# Patient Record
Sex: FEMALE | Race: ASIAN | NOT HISPANIC OR LATINO
[De-identification: names, ages, dates, MRNs, and addresses within clinical notes are randomized per-mention and may not be internally consistent; named-entity substitution may affect disease eponyms.]

---

## 2023-08-24 ENCOUNTER — NON-APPOINTMENT (OUTPATIENT)
Age: 33
End: 2023-08-24

## 2023-08-25 ENCOUNTER — ASOB RESULT (OUTPATIENT)
Age: 33
End: 2023-08-25

## 2023-08-25 ENCOUNTER — APPOINTMENT (OUTPATIENT)
Dept: ANTEPARTUM | Facility: CLINIC | Age: 33
End: 2023-08-25
Payer: COMMERCIAL

## 2023-08-25 PROCEDURE — 76801 OB US < 14 WKS SINGLE FETUS: CPT | Mod: 59

## 2023-08-25 PROCEDURE — 36415 COLL VENOUS BLD VENIPUNCTURE: CPT

## 2023-08-25 PROCEDURE — 93976 VASCULAR STUDY: CPT

## 2023-08-25 PROCEDURE — 76813 OB US NUCHAL MEAS 1 GEST: CPT

## 2023-09-25 ENCOUNTER — ASOB RESULT (OUTPATIENT)
Age: 33
End: 2023-09-25

## 2023-09-25 ENCOUNTER — APPOINTMENT (OUTPATIENT)
Dept: ANTEPARTUM | Facility: CLINIC | Age: 33
End: 2023-09-25
Payer: COMMERCIAL

## 2023-09-25 PROCEDURE — 76805 OB US >/= 14 WKS SNGL FETUS: CPT

## 2023-09-25 PROCEDURE — 76817 TRANSVAGINAL US OBSTETRIC: CPT

## 2023-10-26 ENCOUNTER — ASOB RESULT (OUTPATIENT)
Age: 33
End: 2023-10-26

## 2023-10-26 ENCOUNTER — APPOINTMENT (OUTPATIENT)
Dept: ANTEPARTUM | Facility: CLINIC | Age: 33
End: 2023-10-26
Payer: COMMERCIAL

## 2023-10-26 PROCEDURE — 76817 TRANSVAGINAL US OBSTETRIC: CPT | Mod: 59

## 2023-10-26 PROCEDURE — 76811 OB US DETAILED SNGL FETUS: CPT

## 2023-11-01 ENCOUNTER — ASOB RESULT (OUTPATIENT)
Age: 33
End: 2023-11-01

## 2023-11-01 ENCOUNTER — APPOINTMENT (OUTPATIENT)
Dept: ANTEPARTUM | Facility: CLINIC | Age: 33
End: 2023-11-01
Payer: COMMERCIAL

## 2023-11-01 PROCEDURE — 76946 ECHO GUIDE FOR AMNIOCENTESIS: CPT

## 2023-11-01 PROCEDURE — 59000 AMNIOCENTESIS DIAGNOSTIC: CPT

## 2023-11-27 ENCOUNTER — APPOINTMENT (OUTPATIENT)
Dept: ANTEPARTUM | Facility: CLINIC | Age: 33
End: 2023-11-27
Payer: COMMERCIAL

## 2023-11-27 ENCOUNTER — ASOB RESULT (OUTPATIENT)
Age: 33
End: 2023-11-27

## 2023-11-27 PROCEDURE — 76820 UMBILICAL ARTERY ECHO: CPT | Mod: 59

## 2023-11-27 PROCEDURE — 76816 OB US FOLLOW-UP PER FETUS: CPT

## 2023-11-27 PROCEDURE — 76819 FETAL BIOPHYS PROFIL W/O NST: CPT

## 2024-01-09 ENCOUNTER — APPOINTMENT (OUTPATIENT)
Dept: ANTEPARTUM | Facility: CLINIC | Age: 34
End: 2024-01-09
Payer: COMMERCIAL

## 2024-01-09 ENCOUNTER — ASOB RESULT (OUTPATIENT)
Age: 34
End: 2024-01-09

## 2024-01-09 PROCEDURE — 76819 FETAL BIOPHYS PROFIL W/O NST: CPT

## 2024-01-09 PROCEDURE — 76816 OB US FOLLOW-UP PER FETUS: CPT | Mod: 59

## 2024-01-29 ENCOUNTER — APPOINTMENT (OUTPATIENT)
Dept: ANTEPARTUM | Facility: CLINIC | Age: 34
End: 2024-01-29
Payer: COMMERCIAL

## 2024-01-29 ENCOUNTER — ASOB RESULT (OUTPATIENT)
Age: 34
End: 2024-01-29

## 2024-01-29 PROCEDURE — 76820 UMBILICAL ARTERY ECHO: CPT | Mod: 59

## 2024-01-29 PROCEDURE — 76816 OB US FOLLOW-UP PER FETUS: CPT | Mod: 59

## 2024-01-29 PROCEDURE — 76819 FETAL BIOPHYS PROFIL W/O NST: CPT

## 2024-02-12 ENCOUNTER — ASOB RESULT (OUTPATIENT)
Age: 34
End: 2024-02-12

## 2024-02-12 ENCOUNTER — APPOINTMENT (OUTPATIENT)
Dept: ANTEPARTUM | Facility: CLINIC | Age: 34
End: 2024-02-12
Payer: COMMERCIAL

## 2024-02-12 PROCEDURE — 76816 OB US FOLLOW-UP PER FETUS: CPT

## 2024-02-12 PROCEDURE — 76820 UMBILICAL ARTERY ECHO: CPT | Mod: 59

## 2024-02-12 PROCEDURE — 76819 FETAL BIOPHYS PROFIL W/O NST: CPT | Mod: 59

## 2024-02-26 ENCOUNTER — APPOINTMENT (OUTPATIENT)
Dept: ANTEPARTUM | Facility: CLINIC | Age: 34
End: 2024-02-26
Payer: COMMERCIAL

## 2024-02-26 ENCOUNTER — ASOB RESULT (OUTPATIENT)
Age: 34
End: 2024-02-26

## 2024-02-26 PROCEDURE — 76820 UMBILICAL ARTERY ECHO: CPT | Mod: 59

## 2024-02-26 PROCEDURE — 76816 OB US FOLLOW-UP PER FETUS: CPT | Mod: 59

## 2024-02-26 PROCEDURE — 76819 FETAL BIOPHYS PROFIL W/O NST: CPT

## 2024-02-27 ENCOUNTER — INPATIENT (INPATIENT)
Facility: HOSPITAL | Age: 34
LOS: 1 days | Discharge: HOME | End: 2024-02-29
Attending: OBSTETRICS & GYNECOLOGY | Admitting: OBSTETRICS & GYNECOLOGY
Payer: COMMERCIAL

## 2024-02-27 VITALS
RESPIRATION RATE: 20 BRPM | DIASTOLIC BLOOD PRESSURE: 68 MMHG | HEART RATE: 83 BPM | OXYGEN SATURATION: 100 % | SYSTOLIC BLOOD PRESSURE: 125 MMHG | TEMPERATURE: 98 F

## 2024-02-27 DIAGNOSIS — O26.899 OTHER SPECIFIED PREGNANCY RELATED CONDITIONS, UNSPECIFIED TRIMESTER: ICD-10-CM

## 2024-02-27 LAB
ALBUMIN SERPL ELPH-MCNC: 3.4 G/DL — SIGNIFICANT CHANGE UP (ref 3.3–5)
ALP SERPL-CCNC: 204 U/L — HIGH (ref 40–120)
ALT FLD-CCNC: 10 U/L — SIGNIFICANT CHANGE UP (ref 10–45)
ANION GAP SERPL CALC-SCNC: 9 MMOL/L — SIGNIFICANT CHANGE UP (ref 5–17)
APTT BLD: 28.6 SEC — SIGNIFICANT CHANGE UP (ref 24.5–35.6)
AST SERPL-CCNC: 18 U/L — SIGNIFICANT CHANGE UP (ref 10–40)
BASOPHILS # BLD AUTO: 0.04 K/UL — SIGNIFICANT CHANGE UP (ref 0–0.2)
BASOPHILS NFR BLD AUTO: 0.4 % — SIGNIFICANT CHANGE UP (ref 0–2)
BILIRUB SERPL-MCNC: 0.2 MG/DL — SIGNIFICANT CHANGE UP (ref 0.2–1.2)
BLD GP AB SCN SERPL QL: NEGATIVE — SIGNIFICANT CHANGE UP
BUN SERPL-MCNC: 8 MG/DL — SIGNIFICANT CHANGE UP (ref 7–23)
CALCIUM SERPL-MCNC: 9.7 MG/DL — SIGNIFICANT CHANGE UP (ref 8.4–10.5)
CHLORIDE SERPL-SCNC: 105 MMOL/L — SIGNIFICANT CHANGE UP (ref 96–108)
CO2 SERPL-SCNC: 21 MMOL/L — LOW (ref 22–31)
CREAT ?TM UR-MCNC: 50 MG/DL — SIGNIFICANT CHANGE UP
CREAT SERPL-MCNC: 0.8 MG/DL — SIGNIFICANT CHANGE UP (ref 0.5–1.3)
EGFR: 99 ML/MIN/1.73M2 — SIGNIFICANT CHANGE UP
EOSINOPHIL # BLD AUTO: 0.04 K/UL — SIGNIFICANT CHANGE UP (ref 0–0.5)
EOSINOPHIL NFR BLD AUTO: 0.4 % — SIGNIFICANT CHANGE UP (ref 0–6)
FIBRINOGEN PPP-MCNC: 516 MG/DL — HIGH (ref 200–445)
GLUCOSE SERPL-MCNC: 104 MG/DL — HIGH (ref 70–99)
HCT VFR BLD CALC: 33.1 % — LOW (ref 34.5–45)
HGB BLD-MCNC: 10.8 G/DL — LOW (ref 11.5–15.5)
IMM GRANULOCYTES NFR BLD AUTO: 1.8 % — HIGH (ref 0–0.9)
INR BLD: 0.91 — SIGNIFICANT CHANGE UP (ref 0.85–1.18)
LDH SERPL L TO P-CCNC: 222 U/L — SIGNIFICANT CHANGE UP (ref 50–242)
LYMPHOCYTES # BLD AUTO: 1.47 K/UL — SIGNIFICANT CHANGE UP (ref 1–3.3)
LYMPHOCYTES # BLD AUTO: 14.1 % — SIGNIFICANT CHANGE UP (ref 13–44)
MCHC RBC-ENTMCNC: 29.6 PG — SIGNIFICANT CHANGE UP (ref 27–34)
MCHC RBC-ENTMCNC: 32.6 GM/DL — SIGNIFICANT CHANGE UP (ref 32–36)
MCV RBC AUTO: 90.7 FL — SIGNIFICANT CHANGE UP (ref 80–100)
MONOCYTES # BLD AUTO: 0.7 K/UL — SIGNIFICANT CHANGE UP (ref 0–0.9)
MONOCYTES NFR BLD AUTO: 6.7 % — SIGNIFICANT CHANGE UP (ref 2–14)
NEUTROPHILS # BLD AUTO: 8.01 K/UL — HIGH (ref 1.8–7.4)
NEUTROPHILS NFR BLD AUTO: 76.6 % — SIGNIFICANT CHANGE UP (ref 43–77)
NRBC # BLD: 0 /100 WBCS — SIGNIFICANT CHANGE UP (ref 0–0)
PLATELET # BLD AUTO: 208 K/UL — SIGNIFICANT CHANGE UP (ref 150–400)
POTASSIUM SERPL-MCNC: 4.4 MMOL/L — SIGNIFICANT CHANGE UP (ref 3.5–5.3)
POTASSIUM SERPL-SCNC: 4.4 MMOL/L — SIGNIFICANT CHANGE UP (ref 3.5–5.3)
PROT ?TM UR-MCNC: 6 MG/DL — SIGNIFICANT CHANGE UP (ref 0–12)
PROT SERPL-MCNC: 6.8 G/DL — SIGNIFICANT CHANGE UP (ref 6–8.3)
PROT/CREAT UR-RTO: 0.1 RATIO — SIGNIFICANT CHANGE UP (ref 0–0.2)
PROTHROM AB SERPL-ACNC: 10.4 SEC — SIGNIFICANT CHANGE UP (ref 9.5–13)
RBC # BLD: 3.65 M/UL — LOW (ref 3.8–5.2)
RBC # FLD: 13.8 % — SIGNIFICANT CHANGE UP (ref 10.3–14.5)
RH IG SCN BLD-IMP: POSITIVE — SIGNIFICANT CHANGE UP
RH IG SCN BLD-IMP: POSITIVE — SIGNIFICANT CHANGE UP
SODIUM SERPL-SCNC: 135 MMOL/L — SIGNIFICANT CHANGE UP (ref 135–145)
URATE SERPL-MCNC: 5.2 MG/DL — SIGNIFICANT CHANGE UP (ref 2.5–7)
WBC # BLD: 10.45 K/UL — SIGNIFICANT CHANGE UP (ref 3.8–10.5)
WBC # FLD AUTO: 10.45 K/UL — SIGNIFICANT CHANGE UP (ref 3.8–10.5)

## 2024-02-27 RX ORDER — MAGNESIUM HYDROXIDE 400 MG/1
30 TABLET, CHEWABLE ORAL
Refills: 0 | Status: DISCONTINUED | OUTPATIENT
Start: 2024-02-27 | End: 2024-02-29

## 2024-02-27 RX ORDER — BENZOCAINE 10 %
1 GEL (GRAM) MUCOUS MEMBRANE EVERY 6 HOURS
Refills: 0 | Status: DISCONTINUED | OUTPATIENT
Start: 2024-02-27 | End: 2024-02-29

## 2024-02-27 RX ORDER — OXYTOCIN 10 UNIT/ML
VIAL (ML) INJECTION
Qty: 30 | Refills: 0 | Status: DISCONTINUED | OUTPATIENT
Start: 2024-02-27 | End: 2024-02-27

## 2024-02-27 RX ORDER — CITRIC ACID/SODIUM CITRATE 300-500 MG
15 SOLUTION, ORAL ORAL EVERY 6 HOURS
Refills: 0 | Status: DISCONTINUED | OUTPATIENT
Start: 2024-02-27 | End: 2024-02-27

## 2024-02-27 RX ORDER — FENTANYL/BUPIVACAINE/NS/PF 2MCG/ML-.1
250 PLASTIC BAG, INJECTION (ML) INJECTION
Refills: 0 | Status: DISCONTINUED | OUTPATIENT
Start: 2024-02-27 | End: 2024-02-27

## 2024-02-27 RX ORDER — PRAMOXINE HYDROCHLORIDE 150 MG/15G
1 AEROSOL, FOAM RECTAL EVERY 4 HOURS
Refills: 0 | Status: DISCONTINUED | OUTPATIENT
Start: 2024-02-27 | End: 2024-02-29

## 2024-02-27 RX ORDER — SIMETHICONE 80 MG/1
80 TABLET, CHEWABLE ORAL EVERY 4 HOURS
Refills: 0 | Status: DISCONTINUED | OUTPATIENT
Start: 2024-02-27 | End: 2024-02-29

## 2024-02-27 RX ORDER — TETANUS TOXOID, REDUCED DIPHTHERIA TOXOID AND ACELLULAR PERTUSSIS VACCINE, ADSORBED 5; 2.5; 8; 8; 2.5 [IU]/.5ML; [IU]/.5ML; UG/.5ML; UG/.5ML; UG/.5ML
0.5 SUSPENSION INTRAMUSCULAR ONCE
Refills: 0 | Status: DISCONTINUED | OUTPATIENT
Start: 2024-02-27 | End: 2024-02-29

## 2024-02-27 RX ORDER — CHLORHEXIDINE GLUCONATE 213 G/1000ML
1 SOLUTION TOPICAL DAILY
Refills: 0 | Status: DISCONTINUED | OUTPATIENT
Start: 2024-02-27 | End: 2024-02-27

## 2024-02-27 RX ORDER — LANOLIN
1 OINTMENT (GRAM) TOPICAL EVERY 6 HOURS
Refills: 0 | Status: DISCONTINUED | OUTPATIENT
Start: 2024-02-27 | End: 2024-02-29

## 2024-02-27 RX ORDER — ACETAMINOPHEN 500 MG
975 TABLET ORAL
Refills: 0 | Status: DISCONTINUED | OUTPATIENT
Start: 2024-02-27 | End: 2024-02-29

## 2024-02-27 RX ORDER — SODIUM CHLORIDE 9 MG/ML
3 INJECTION INTRAMUSCULAR; INTRAVENOUS; SUBCUTANEOUS EVERY 8 HOURS
Refills: 0 | Status: DISCONTINUED | OUTPATIENT
Start: 2024-02-27 | End: 2024-02-29

## 2024-02-27 RX ORDER — OXYCODONE HYDROCHLORIDE 5 MG/1
5 TABLET ORAL
Refills: 0 | Status: DISCONTINUED | OUTPATIENT
Start: 2024-02-27 | End: 2024-02-29

## 2024-02-27 RX ORDER — SODIUM CHLORIDE 9 MG/ML
1000 INJECTION, SOLUTION INTRAVENOUS
Refills: 0 | Status: DISCONTINUED | OUTPATIENT
Start: 2024-02-27 | End: 2024-02-27

## 2024-02-27 RX ORDER — AER TRAVELER 0.5 G/1
1 SOLUTION RECTAL; TOPICAL EVERY 4 HOURS
Refills: 0 | Status: DISCONTINUED | OUTPATIENT
Start: 2024-02-27 | End: 2024-02-29

## 2024-02-27 RX ORDER — OXYTOCIN 10 UNIT/ML
41.67 VIAL (ML) INJECTION
Qty: 20 | Refills: 0 | Status: DISCONTINUED | OUTPATIENT
Start: 2024-02-27 | End: 2024-02-29

## 2024-02-27 RX ORDER — OXYCODONE HYDROCHLORIDE 5 MG/1
5 TABLET ORAL ONCE
Refills: 0 | Status: DISCONTINUED | OUTPATIENT
Start: 2024-02-27 | End: 2024-02-29

## 2024-02-27 RX ORDER — OXYTOCIN 10 UNIT/ML
333.33 VIAL (ML) INJECTION
Qty: 20 | Refills: 0 | Status: DISCONTINUED | OUTPATIENT
Start: 2024-02-27 | End: 2024-02-27

## 2024-02-27 RX ORDER — DIBUCAINE 1 %
1 OINTMENT (GRAM) RECTAL EVERY 6 HOURS
Refills: 0 | Status: DISCONTINUED | OUTPATIENT
Start: 2024-02-27 | End: 2024-02-29

## 2024-02-27 RX ORDER — DIPHENHYDRAMINE HCL 50 MG
25 CAPSULE ORAL EVERY 6 HOURS
Refills: 0 | Status: DISCONTINUED | OUTPATIENT
Start: 2024-02-27 | End: 2024-02-29

## 2024-02-27 RX ORDER — IBUPROFEN 200 MG
600 TABLET ORAL EVERY 6 HOURS
Refills: 0 | Status: COMPLETED | OUTPATIENT
Start: 2024-02-27 | End: 2025-01-25

## 2024-02-27 RX ORDER — IBUPROFEN 200 MG
600 TABLET ORAL EVERY 6 HOURS
Refills: 0 | Status: DISCONTINUED | OUTPATIENT
Start: 2024-02-27 | End: 2024-02-29

## 2024-02-27 RX ORDER — KETOROLAC TROMETHAMINE 30 MG/ML
30 SYRINGE (ML) INJECTION ONCE
Refills: 0 | Status: DISCONTINUED | OUTPATIENT
Start: 2024-02-27 | End: 2024-02-27

## 2024-02-27 RX ORDER — HYDROCORTISONE 1 %
1 OINTMENT (GRAM) TOPICAL EVERY 6 HOURS
Refills: 0 | Status: DISCONTINUED | OUTPATIENT
Start: 2024-02-27 | End: 2024-02-29

## 2024-02-27 RX ADMIN — Medication 250 MILLILITER(S): at 16:48

## 2024-02-27 RX ADMIN — SODIUM CHLORIDE 125 MILLILITER(S): 9 INJECTION, SOLUTION INTRAVENOUS at 11:31

## 2024-02-27 RX ADMIN — Medication 30 MILLIGRAM(S): at 23:17

## 2024-02-27 RX ADMIN — SODIUM CHLORIDE 125 MILLILITER(S): 9 INJECTION, SOLUTION INTRAVENOUS at 16:29

## 2024-02-27 RX ADMIN — Medication 2 MILLIUNIT(S)/MIN: at 11:45

## 2024-02-27 RX ADMIN — SODIUM CHLORIDE 125 MILLILITER(S): 9 INJECTION, SOLUTION INTRAVENOUS at 15:34

## 2024-02-27 NOTE — OB PROVIDER DELIVERY SUMMARY - NSSELHIDDEN_OBGYN_ALL_OB_FT
[NS_DeliveryAttending1_OBGYN_ALL_OB_FT:[id number]==],[NS_DeliveryAssist1_OBGYN_ALL_OB_FT:[id number]],[NS_DeliveryRN_OBGYN_ALL_OB_FT:[id number]=],[NS_CirculateRN2_OBGYN_ALL_OB_FT:[id number]]

## 2024-02-27 NOTE — OB PROVIDER LABOR PROGRESS NOTE - NS_OBIHIFHRDETAILS_OBGYN_ALL_OB_FT
130 baseline, moderate variability, +accels, no decels
baseline 135;  moderate variability; -accels; -decels; category I tracing
baseline 135; moderate variability; +accels; -decels; category I tracing
baseline 140; moderate variability; +accels; -decels; category I tracing
baseline 145; moderate variability; -accels; non recurrent variable decels; category II tracing overall reassuring with moderate variability
categ 1 tracing
baseline 130; moderate variability; +accels; -decels; reactive and reassuring tracing

## 2024-02-27 NOTE — OB RN DELIVERY SUMMARY - NSSELHIDDEN_OBGYN_ALL_OB_FT
[NS_DeliveryAttending1_OBGYN_ALL_OB_FT:[id number]],[NS_DeliveryAssist1_OBGYN_ALL_OB_FT:[id number]=],[NS_DeliveryRN_OBGYN_ALL_OB_FT:[id number]=],[NS_CirculateRN2_OBGYN_ALL_OB_FT:[id number]=]

## 2024-02-27 NOTE — OB PROVIDER H&P - HISTORY OF PRESENT ILLNESS
32 yo  at 39 weeks 2 days presenting with premature rupture of membranes which occurred at 7:30am on .   Positive nitrazine, ferning, and pooling on speculum exam. Cervix softening at 1cm dilated, patient not having regular contractions (felt two contractions over past 3 hours). Two fetal movements over past 3 hours.  US showed cephalic long   32 yo  at 39 weeks 2 days presenting with leakage of fluid which occurred at 7:30am on . She reports active leakage of fluid since this initial event.  Patient not having regular contractions (felt two contractions over past 3 hours). Two fetal movements over past 3 hours. Denies vaginal bleeding. Endorses fetal movements.    Ante: spontaneous pregnancy; NIPT wnl; Amniocentesis wnl for fetal VSD; Anatomy scan otherwise wnl; Fetal echo wnl, VSD resolved; GCT passed; Denies elevated BPs; GBS negative; EFW 3500g    OBHx: G1 current  GYNHx: Right lateral 5cm fibroid; denies other  PMH: denies  PSH: denies  Meds: PNVs  ALL: NKDA      PE:  Gen: no apparent distress  Pulm: no increased work of breathing  Abd: nontender; gravid  Ext: no calf tenderness bilaterally    SVE: 1/long  SSE: pooling clear fluid; nitrazine positive; ferning positive    TAUS: cephalic fetus    FHT: baseline 140; moderate variability; +accels; -decels; reactive and reassuring tracing  TOCO: irregular contractions 2-3 in 10min      A/P:  32 yo  at 39w2d presents with LOF, found to have PROM. Patient not experiencing regular or painful contractions in the presence of minimal cervical change. Maternal and fetal statuses reassuring at this time  - Admit to LD  - Diet NPO  - Continuous fetal monitoring  - Labs ordered; full labs  - Plan for augmentation with pitocin  - Consents signed  - Discussed with Dr. Lopez, Dr. Mejia

## 2024-02-27 NOTE — OB PROVIDER H&P - NS_OBGYNHISTORY_OBGYN_ALL_OB_FT
resolved fetal VSD  Had amnio which was normal and normal fetal anatomy scan  GBS negative  Estimated fetal weight 3260g  Previously taking aspirin per OBGYN, reports poor compliance and discontinued at 36 weeks  5cm subserosal fibroid on the right

## 2024-02-27 NOTE — OB RN TRIAGE NOTE - FALL HARM RISK - UNIVERSAL INTERVENTIONS
Bed in lowest position, wheels locked, appropriate side rails in place/Call bell, personal items and telephone in reach/Instruct patient to call for assistance before getting out of bed or chair/Non-slip footwear when patient is out of bed/[place] to call system/Physically safe environment - no spills, clutter or unnecessary equipment/Purposeful Proactive Rounding/Room/bathroom lighting operational, light cord in reach

## 2024-02-27 NOTE — OB PROVIDER LABOR PROGRESS NOTE - NS_SUBJECTIVE/OBJECTIVE_OBGYN_ALL_OB_FT
EFM reviewed
EFM reviewed  Patient resting comfortably in bed with epidural
Assuming care for night team
EFM reviewed
EFM reviewed  Patient requesting an epidural
EFM reviewed
feel mild ctx

## 2024-02-27 NOTE — OB PROVIDER H&P - NSLOWPPHRISK_OBGYN_A_OB
No previous uterine incision/Lu Pregnancy/Less than or equal to 4 previous vaginal births/No known bleeding disorder/No history of postpartum hemorrhage/No other PPH risks indicated

## 2024-02-27 NOTE — OB PROVIDER LABOR PROGRESS NOTE - ASSESSMENT
- Category I tracing in latent labor undergoing augmentation with pitocin with inadequate contractions at the moment  - Pitocin paused at the moment prior to epidural placement  - After epidural placement, restart pitocin (previous rate 6mU/min)  - Next exam per attending
- Reactive and reassuring tracing in latent labor  - START ptiocin soon  - Follow-up labs  - Continue to monitor vitals per nursing protocol
- Category I tracing in latent labor with adequate contractions per frequency  - Pitocin 4mU/min; continue to titrate as needed to maintain adequate contractions  - Attending to perform next exam
- category I tracing during augmentation of labor following prelabor rupture of membranes  - Pitocin 8mU/min; continue to titrate as needed to maintain adequate contractions  - Next exam per attending
- Category II tracing in latent labor with inadequate contractions per frequency  - Continue to reposition and resuscitate as needed  - Pitocin 6mU/min; continue to titrate as needed to maintain adequate contractions  - EPidural prn
category I tracing  pitocin at 8mU continue to uptitrate 
cont current plan

## 2024-02-27 NOTE — OB RN DELIVERY SUMMARY - NS_SEPSISRSKCALC_OBGYN_ALL_OB_FT
EOS calculated successfully. EOS Risk Factor: 0.5/1000 live births ([facility] national incidence); GA=39w2d; Temp=98.4; ROM=14.383; GBS='Negative'; Antibiotics='No antibiotics or any antibiotics < 2 hrs prior to birth'

## 2024-02-27 NOTE — OB PROVIDER LABOR PROGRESS NOTE - NS_OBIHICONTRACTIONPATTERNDETAILS_OBGYN_ALL_OB_FT
irregular contractions q2-4min
ctx q 2-3 minutes
irregular contractions 1-2 in 10min
irregular contractions q1-2min
irregular contractions q1-2min
irregular contractions q1-4min

## 2024-02-27 NOTE — OB RN PATIENT PROFILE - FALL HARM RISK - UNIVERSAL INTERVENTIONS
Bed in lowest position, wheels locked, appropriate side rails in place/Call bell, personal items and telephone in reach/Instruct patient to call for assistance before getting out of bed or chair/Non-slip footwear when patient is out of bed/[place] to call system/Physically safe environment - no spills, clutter or unnecessary equipment/Purposeful Proactive Rounding/Room/bathroom lighting operational, light cord in reach

## 2024-02-27 NOTE — OB PROVIDER H&P - NSHPPHYSICALEXAM_GEN_ALL_CORE
Positive nitrazine, ferning, and pooling on speculum exam. Cervix softening at 1cm dilated, patient not having regular contractions (felt two contractions over past 3 hours). Two fetal movements over past 3 hours.  US showed cephalic long

## 2024-02-27 NOTE — OB PROVIDER DELIVERY SUMMARY - NSPROVIDERDELIVERYNOTE_OBGYN_ALL_OB_FT
34 yo  s/p  at 39w2d after presenting with SROM. Patient labor was augmented with pitocin and she received epidural for analgesia. Patient became fully dilated, pushed effectively and had  of viable male infant on 24, nuchal cord reduced at perineum. Placenta delivered spontaneously intact. A second degree laceration was repaired with vicryl suture. Rectal exam performed and no sutures palpated. . Mother and infant in stable condition patient tolerated procedure well. 32 yo  s/p  at 39w2d after presenting with SROM. Patient labor was augmented with pitocin and she received epidural for analgesia. Patient became fully dilated, pushed effectively and had  of viable male infant on 24, nuchal cord reduced at perineum. Placenta delivered spontaneously intact. A second degree laceration was repaired with 2-0 chromic suture. Rectal exam performed and no sutures palpated. . Mother and infant in stable condition patient tolerated procedure well.

## 2024-02-28 PROBLEM — Z00.00 ENCOUNTER FOR PREVENTIVE HEALTH EXAMINATION: Status: ACTIVE | Noted: 2024-02-28

## 2024-02-28 LAB — T PALLIDUM AB TITR SER: NEGATIVE — SIGNIFICANT CHANGE UP

## 2024-02-28 RX ADMIN — Medication 975 MILLIGRAM(S): at 09:07

## 2024-02-28 RX ADMIN — Medication 1 TABLET(S): at 12:40

## 2024-02-28 RX ADMIN — Medication 1 APPLICATION(S): at 07:21

## 2024-02-28 RX ADMIN — Medication 1 SPRAY(S): at 03:30

## 2024-02-28 RX ADMIN — Medication 975 MILLIGRAM(S): at 10:05

## 2024-02-28 RX ADMIN — Medication 975 MILLIGRAM(S): at 21:45

## 2024-02-28 RX ADMIN — Medication 600 MILLIGRAM(S): at 18:55

## 2024-02-28 RX ADMIN — Medication 600 MILLIGRAM(S): at 13:35

## 2024-02-28 RX ADMIN — Medication 600 MILLIGRAM(S): at 06:26

## 2024-02-28 RX ADMIN — Medication 1 APPLICATION(S): at 21:45

## 2024-02-28 RX ADMIN — Medication 600 MILLIGRAM(S): at 12:40

## 2024-02-28 RX ADMIN — Medication 975 MILLIGRAM(S): at 15:28

## 2024-02-28 RX ADMIN — Medication 975 MILLIGRAM(S): at 03:25

## 2024-02-28 NOTE — LACTATION INITIAL EVALUATION - NS LACT CON REASON FOR REQ
Dyad seen at 24hrs post birth. Demonstrate hand expressing colostrum, bilateral expressible colostrum present. Place infant on Rt breast with football hold, infant latched with widely open flanged mouth and rhythmic sucking sustained without nipple pain. Discuss correct latch, principles of  breast milk production process. Mom comfortable with latching and positioning. Mom understand to feed infant 10-12 feeding in 24hrs period and expected amount of breast milk supply as per daily age./primaparous mom

## 2024-02-29 ENCOUNTER — TRANSCRIPTION ENCOUNTER (OUTPATIENT)
Age: 34
End: 2024-02-29

## 2024-02-29 VITALS
HEART RATE: 98 BPM | DIASTOLIC BLOOD PRESSURE: 82 MMHG | RESPIRATION RATE: 18 BRPM | OXYGEN SATURATION: 99 % | SYSTOLIC BLOOD PRESSURE: 125 MMHG | TEMPERATURE: 99 F

## 2024-02-29 PROCEDURE — 86900 BLOOD TYPING SEROLOGIC ABO: CPT

## 2024-02-29 PROCEDURE — 83615 LACTATE (LD) (LDH) ENZYME: CPT

## 2024-02-29 PROCEDURE — 59050 FETAL MONITOR W/REPORT: CPT

## 2024-02-29 PROCEDURE — 84156 ASSAY OF PROTEIN URINE: CPT

## 2024-02-29 PROCEDURE — 86780 TREPONEMA PALLIDUM: CPT

## 2024-02-29 PROCEDURE — 85384 FIBRINOGEN ACTIVITY: CPT

## 2024-02-29 PROCEDURE — 36415 COLL VENOUS BLD VENIPUNCTURE: CPT

## 2024-02-29 PROCEDURE — 85025 COMPLETE CBC W/AUTO DIFF WBC: CPT

## 2024-02-29 PROCEDURE — 85610 PROTHROMBIN TIME: CPT

## 2024-02-29 PROCEDURE — 84550 ASSAY OF BLOOD/URIC ACID: CPT

## 2024-02-29 PROCEDURE — 82570 ASSAY OF URINE CREATININE: CPT

## 2024-02-29 PROCEDURE — 80053 COMPREHEN METABOLIC PANEL: CPT

## 2024-02-29 PROCEDURE — 86850 RBC ANTIBODY SCREEN: CPT

## 2024-02-29 PROCEDURE — 85730 THROMBOPLASTIN TIME PARTIAL: CPT

## 2024-02-29 PROCEDURE — 86901 BLOOD TYPING SEROLOGIC RH(D): CPT

## 2024-02-29 RX ORDER — POLYETHYLENE GLYCOL 3350 17 G/17G
17 POWDER, FOR SOLUTION ORAL ONCE
Refills: 0 | Status: DISCONTINUED | OUTPATIENT
Start: 2024-02-29 | End: 2024-02-29

## 2024-02-29 RX ORDER — IBUPROFEN 200 MG
1 TABLET ORAL
Qty: 0 | Refills: 0 | DISCHARGE
Start: 2024-02-29

## 2024-02-29 RX ORDER — ACETAMINOPHEN 500 MG
3 TABLET ORAL
Qty: 0 | Refills: 0 | DISCHARGE
Start: 2024-02-29

## 2024-02-29 RX ADMIN — Medication 600 MILLIGRAM(S): at 12:41

## 2024-02-29 RX ADMIN — Medication 1 TABLET(S): at 12:42

## 2024-02-29 RX ADMIN — Medication 975 MILLIGRAM(S): at 15:02

## 2024-02-29 RX ADMIN — Medication 600 MILLIGRAM(S): at 06:12

## 2024-02-29 RX ADMIN — Medication 600 MILLIGRAM(S): at 00:44

## 2024-02-29 RX ADMIN — Medication 975 MILLIGRAM(S): at 09:14

## 2024-02-29 RX ADMIN — Medication 975 MILLIGRAM(S): at 03:03

## 2024-02-29 RX ADMIN — Medication 1 SPRAY(S): at 12:41

## 2024-02-29 NOTE — PROGRESS NOTE ADULT - SUBJECTIVE AND OBJECTIVE BOX
Patient evaluated at bedside this morning, resting comfortable in bed, no acute events overnight. She reports pain is well-controlled.  She denies headache, dizziness, changes in vision, chest pain, palpitations, shortness of breath, RUQ pain, nausea, vomiting, fever, chills, heavy vaginal bleeding.  She has been ambulating without assistance, voiding spontaneously, tolerating food.      Physical Exam:  T(C): 37.3 (02-28-24 @ 06:05), Max: 37.7 (02-28-24 @ 00:40)  HR: 96 (02-28-24 @ 06:05) (70 - 96)  BP: 117/70 (02-28-24 @ 06:05) (117/70 - 143/72)  RR: 18 (02-28-24 @ 06:05) (15 - 20)  SpO2: 98% (02-28-24 @ 06:05) (97% - 100%)    Gen: no apparent distress  Pulm: no increased work of breathing  Abd: soft, nontender, nondistended, no rebound or guarding, uterus firm  Extremities: trace pedal edema bilaterally, no calf tenderness bilaterally                          10.8   10.45 )-----------( 208      ( 27 Feb 2024 10:46 )             33.1     02-27    135  |  105  |  8   ----------------------------<  104<H>  4.4   |  21<L>  |  0.80    Ca    9.7      27 Feb 2024 10:46    TPro  6.8  /  Alb  3.4  /  TBili  0.2  /  DBili  x   /  AST  18  /  ALT  10  /  AlkPhos  204<H>  02-27    acetaminophen     Tablet .. 975 milliGRAM(s) Oral <User Schedule>  benzocaine 20%/menthol 0.5% Spray 1 Spray(s) Topical every 6 hours PRN  dibucaine 1% Ointment 1 Application(s) Topical every 6 hours PRN  diphenhydrAMINE 25 milliGRAM(s) Oral every 6 hours PRN  diphtheria/tetanus/pertussis (acellular) Vaccine (Adacel) 0.5 milliLiter(s) IntraMuscular once  fentanyl (2 MICROgram(s)/mL) + bupivacaine 0.0625%  in 0.9% Sodium Chloride PCEA 250 milliLiter(s) Epidural PCA Continuous  hydrocortisone 1% Cream 1 Application(s) Topical every 6 hours PRN  ibuprofen  Tablet. 600 milliGRAM(s) Oral every 6 hours  lanolin Ointment 1 Application(s) Topical every 6 hours PRN  magnesium hydroxide Suspension 30 milliLiter(s) Oral two times a day PRN  oxyCODONE    IR 5 milliGRAM(s) Oral once PRN  oxyCODONE    IR 5 milliGRAM(s) Oral every 3 hours PRN  oxytocin Infusion 41.667 milliUNIT(s)/Min IV Continuous <Continuous>  pramoxine 1%/zinc 5% Cream 1 Application(s) Topical every 4 hours PRN  prenatal multivitamin 1 Tablet(s) Oral daily  simethicone 80 milliGRAM(s) Chew every 4 hours PRN  sodium chloride 0.9% lock flush 3 milliLiter(s) IV Push every 8 hours  witch hazel Pads 1 Application(s) Topical every 4 hours PRN  
Patient evaluated at bedside this morning, resting comfortable in bed, no acute events overnight. She reports pain is well-controlled.  She denies headache, dizziness, changes in vision, chest pain, palpitations, shortness of breath, RUQ pain, nausea, vomiting, fever, chills, heavy vaginal bleeding.  She has been ambulating without assistance, voiding spontaneously, tolerating food.      Physical Exam:  T(C): 36.8 (02-29-24 @ 06:00), Max: 36.8 (02-29-24 @ 02:50)  HR: 70 (02-29-24 @ 06:00) (70 - 87)  BP: 106/64 (02-29-24 @ 06:00) (99/64 - 123/86)  RR: 16 (02-29-24 @ 06:00) (16 - 18)  SpO2: 98% (02-29-24 @ 06:00) (97% - 98%)    Gen: no apparent distress  Pulm: no increased work of breathing  Abd: soft, nontender, nondistended, no rebound or guarding, uterus firm  Extremities: trace pedal edema bilaterally, no calf tenderness bilaterally                          10.8   10.45 )-----------( 208      ( 27 Feb 2024 10:46 )             33.1     02-27    135  |  105  |  8   ----------------------------<  104<H>  4.4   |  21<L>  |  0.80    Ca    9.7      27 Feb 2024 10:46    TPro  6.8  /  Alb  3.4  /  TBili  0.2  /  DBili  x   /  AST  18  /  ALT  10  /  AlkPhos  204<H>  02-27    acetaminophen     Tablet .. 975 milliGRAM(s) Oral <User Schedule>  benzocaine 20%/menthol 0.5% Spray 1 Spray(s) Topical every 6 hours PRN  dibucaine 1% Ointment 1 Application(s) Topical every 6 hours PRN  diphenhydrAMINE 25 milliGRAM(s) Oral every 6 hours PRN  diphtheria/tetanus/pertussis (acellular) Vaccine (Adacel) 0.5 milliLiter(s) IntraMuscular once  fentanyl (2 MICROgram(s)/mL) + bupivacaine 0.0625%  in 0.9% Sodium Chloride PCEA 250 milliLiter(s) Epidural PCA Continuous  hydrocortisone 1% Cream 1 Application(s) Topical every 6 hours PRN  ibuprofen  Tablet. 600 milliGRAM(s) Oral every 6 hours  lanolin Ointment 1 Application(s) Topical every 6 hours PRN  magnesium hydroxide Suspension 30 milliLiter(s) Oral two times a day PRN  oxyCODONE    IR 5 milliGRAM(s) Oral once PRN  oxyCODONE    IR 5 milliGRAM(s) Oral every 3 hours PRN  oxytocin Infusion 41.667 milliUNIT(s)/Min IV Continuous <Continuous>  pramoxine 1%/zinc 5% Cream 1 Application(s) Topical every 4 hours PRN  prenatal multivitamin 1 Tablet(s) Oral daily  simethicone 80 milliGRAM(s) Chew every 4 hours PRN  sodium chloride 0.9% lock flush 3 milliLiter(s) IV Push every 8 hours  witch hazel Pads 1 Application(s) Topical every 4 hours PRN

## 2024-02-29 NOTE — DISCHARGE NOTE OB - HOSPITAL COURSE
Admitted to labor and delivery for prelabor rupture of membranes, underwent normal spontaneous vaginal delivery complicated by gestational hypertension. Postpartum course uncomplicated and at discharge, patient hemodynamically stable.

## 2024-02-29 NOTE — DISCHARGE NOTE OB - MEDICATION SUMMARY - MEDICATIONS TO TAKE
I will START or STAY ON the medications listed below when I get home from the hospital:    ibuprofen 600 mg oral tablet  -- 1 tab(s) by mouth every 6 hours  -- Indication: For Pain    acetaminophen 325 mg oral tablet  -- 3 tab(s) by mouth every 6 hours  -- Indication: For Pain    Prenatal 1 oral capsule  -- orally  -- Indication: For Breastfeeding

## 2024-02-29 NOTE — DISCHARGE NOTE OB - CARE PROVIDER_API CALL
Maritza Mejia  Obstetrics and Gynecology  [address] [place] [id number]  Phone: [phone]  Fax: [phone]  Follow Up Time:

## 2024-02-29 NOTE — DISCHARGE NOTE OB - PLAN OF CARE
- Follow up with your OBGYN in 1 week for a blood pressure check.  - Purchase electronic blood pressure cuff at your local pharmacy. Check blood pressure 3x per day.  - If systolic BP (top number) is greater than 160 OR diastolic BP (bottom number) is greater than 110, you develop a headache not relieved by Tylenol, visual disturbances, or right upper abdominal pain, then call your OBGYN or the hospital, or go to your nearest emergency room.  - Regular diet and normal activity as tolerated.  - Nothing in the vagina for 6 weeks (i.e., no sex, tampons, tub baths, or swimming pools) - Follow up in the office with your doctor in 6 weeks. Please call to confirm your appointment.  - You can eat a regular diet.  - Take 600mg Mortin every 6 hours and/or Tylenol 975mg every 6 hours as needed for pain.  - No heavy lifting and nothing in the vagina until cleared by your doctor.  - Please call your OBGYN with any questions or concerns.

## 2024-02-29 NOTE — PROGRESS NOTE ADULT - ASSESSMENT
A/P  33y s/p  c/b gestational hypertension, PPD# 1, stable, meeting postpartum milestones   - gHTN: normotensive overnight; denies toxic symptoms; continue to monitor vitals  - Pain: well controlled on analgesics as above  - GI: Tolerating regular diet  - : urinating without difficulty/pain  - DVT prophylaxis: ambulating frequently  - Dispo: PPD 2, unless otherwise specified    
A/P  33y s/p  c/b gestational HTN, PPD# 2, stable, meeting postpartum milestones   - gHTN: normotensive; denies toxic complaints; continue to monitor  - Pain: well controlled on analgesics as above  - GI: Tolerating regular diet  - : urinating without difficulty/pain  - DVT prophylaxis: ambulating frequently  - Dispo: PPD 2, unless otherwise specified

## 2024-02-29 NOTE — DISCHARGE NOTE OB - NS MD DC FALL RISK RISK
For information on Fall & Injury Prevention, visit: [email or web address] OR  [email or web address] OR  https://www.cdc.gov/steadi/patient.html

## 2024-02-29 NOTE — DISCHARGE NOTE OB - CARE PLAN
Principal Discharge DX:	Pregnancy, delivered  Assessment and plan of treatment:	- Follow up in the office with your doctor in 6 weeks. Please call to confirm your appointment.  - You can eat a regular diet.  - Take 600mg Mortin every 6 hours and/or Tylenol 975mg every 6 hours as needed for pain.  - No heavy lifting and nothing in the vagina until cleared by your doctor.  - Please call your OBGYN with any questions or concerns.  Secondary Diagnosis:	Gestational hypertension  Assessment and plan of treatment:	- Follow up with your OBGYN in 1 week for a blood pressure check.  - Purchase electronic blood pressure cuff at your local pharmacy. Check blood pressure 3x per day.  - If systolic BP (top number) is greater than 160 OR diastolic BP (bottom number) is greater than 110, you develop a headache not relieved by Tylenol, visual disturbances, or right upper abdominal pain, then call your OBGYN or the hospital, or go to your nearest emergency room.  - Regular diet and normal activity as tolerated.  - Nothing in the vagina for 6 weeks (i.e., no sex, tampons, tub baths, or swimming pools)   1

## 2024-02-29 NOTE — DISCHARGE NOTE OB - PATIENT PORTAL LINK FT
You can access the FollowMyHealth Patient Portal offered by [facility] by registering at the following website: [email or web address]. By joining [employer]’s FollowMyHealth portal, you will also be able to view your health information using other applications (apps) compatible with our system.

## 2024-03-05 DIAGNOSIS — O32.4XX0 MATERNAL CARE FOR HIGH HEAD AT TERM, NOT APPLICABLE OR UNSPECIFIED: ICD-10-CM

## 2024-03-05 DIAGNOSIS — D25.9 LEIOMYOMA OF UTERUS, UNSPECIFIED: ICD-10-CM

## 2024-03-05 DIAGNOSIS — Z3A.39 39 WEEKS GESTATION OF PREGNANCY: ICD-10-CM

## 2024-03-05 DIAGNOSIS — O34.13 MATERNAL CARE FOR BENIGN TUMOR OF CORPUS UTERI, THIRD TRIMESTER: ICD-10-CM

## 2024-03-05 DIAGNOSIS — Z28.09 IMMUNIZATION NOT CARRIED OUT BECAUSE OF OTHER CONTRAINDICATION: ICD-10-CM

## 2024-03-05 DIAGNOSIS — Z88.0 ALLERGY STATUS TO PENICILLIN: ICD-10-CM
